# Patient Record
Sex: FEMALE | Race: WHITE | NOT HISPANIC OR LATINO | URBAN - NONMETROPOLITAN AREA
[De-identification: names, ages, dates, MRNs, and addresses within clinical notes are randomized per-mention and may not be internally consistent; named-entity substitution may affect disease eponyms.]

---

## 2017-03-27 NOTE — PATIENT DISCUSSION
Continue: Refresh Liquigel (carboxymethylcellulose sodium): drops, liquid gel: 1% 1 drop at bedtime into both eyes

## 2017-03-27 NOTE — PATIENT DISCUSSION
Blepharitis/MGD Counseling:   Blepharitis/MGD is inflammation of the eyelids and/or blockage of the oil glands. I have explained to the patient that successful management is dependent on patient compliance with the treatment regimen.

## 2017-03-27 NOTE — PATIENT DISCUSSION
BLURRED VISION, OU- MOST LIKELY DUE TO DRYNESS/MGD. TREAT KIESHA/MGD AND REEVALUATE IF SYMPTOMS PERSIST.

## 2017-03-27 NOTE — PATIENT DISCUSSION
Continue: Refresh Plus (carboxymethylcellulose sodium): dropperette: 0.5% 1 drop five times a day into left eye

## 2017-03-27 NOTE — PATIENT DISCUSSION
BLEPHARITIS/MGD, OU- PRESCRIBE WARM COMPRESSES AND EYELID SCRUBS BID, ARTIFICIAL TEARS BID-QID, AND THE DAILY INTAKE OF OMEGA-3 FATTY ACIDS.

## 2017-04-27 NOTE — PATIENT DISCUSSION
Continue: Refresh Plus (carboxymethylcellulose sodium): dropperette: 0.5% 1 drop five times a day into both eyes

## 2017-04-27 NOTE — PATIENT DISCUSSION
CONTINUE OMEGA 3 MAKE SURE IT IS A TRIGLYCDERIDE AND WARM COPRESSES BID FOLLOWED BY DIGITAL MASSAGE.

## 2018-08-03 NOTE — PATIENT DISCUSSION
Epithelial Membrane Dystrophy Counseling: The nature of the dystrophy was explained to the patient including the fact that often times the dystrophy is asymptomatic. When symptoms such as fluctuating vision, foreign body sensation, or decreased vision present, the treatment can include Martin ointment or drops, artificial tears, punctal plugs, epithelial debridement /scraping and possible polishing of underlying cornea.   If the dystrophy interferes with cataract testing, treatment may need to be initiated first.

## 2018-08-03 NOTE — PATIENT DISCUSSION
PATIENT ALSO MENTIONED USE OF ARTIFICIAL TEARS. RECOMMENDED HE USES CELLUVISC. PATIENT CURRENTLY USES REFRESH LIQUIGEL.

## 2018-08-03 NOTE — PATIENT DISCUSSION
PICTURE OF IDANIA OPTIONS GIVEN TO PATIENT. RECOMMENDED OINTMENT NOT DROPS. LEFT EYE IS WORSE. TOLD PT IF HE WANTS TO JUST TRY IT IN THE LEFT EYE ONLY FOR NOW HE MAY TO SEE IF IT IMPROVES. DO FOR 6 WEEKS AND SEE IF IMPROVEMENT HAPPENS.

## 2019-04-30 ENCOUNTER — IMPORTED ENCOUNTER (OUTPATIENT)
Dept: URBAN - NONMETROPOLITAN AREA CLINIC 1 | Facility: CLINIC | Age: 74
End: 2019-04-30

## 2019-04-30 PROBLEM — E11.9: Noted: 2018-04-25

## 2019-04-30 PROBLEM — H25.813: Noted: 2019-04-30

## 2019-04-30 PROCEDURE — 99214 OFFICE O/P EST MOD 30 MIN: CPT

## 2019-04-30 PROCEDURE — 92250 FUNDUS PHOTOGRAPHY W/I&R: CPT

## 2019-06-07 ENCOUNTER — IMPORTED ENCOUNTER (OUTPATIENT)
Dept: URBAN - NONMETROPOLITAN AREA CLINIC 1 | Facility: CLINIC | Age: 74
End: 2019-06-07

## 2019-06-07 PROBLEM — Z01.818: Noted: 2019-06-11

## 2019-06-07 PROBLEM — E11.9: Noted: 2019-06-07

## 2019-06-07 PROBLEM — Z98.42: Noted: 2019-06-21

## 2019-06-07 PROBLEM — I10: Noted: 2019-06-11

## 2019-06-07 PROBLEM — H25.813: Noted: 2019-06-07

## 2019-06-07 PROCEDURE — 92014 COMPRE OPH EXAM EST PT 1/>: CPT

## 2019-06-07 NOTE — PATIENT DISCUSSION
Cataract OU-Visually significant cataract OU.-Cataract(s) causing symptomatic impairment of visual function not correctable with a tolerable change in glasses or contact lenses lighting or non-operative means resulting in specific activity limitations and/or participation restrictions including but not limited to reading viewing television driving or meeting vocational or recreational needs. -Expectation is clearer vision and functional improvement in symptoms as well as reduced glare disability after cataract removal.-Order IOLMaster and OPD today. -Recommend Standard/Traditional OU based on today's OPD testing and lifestyle questionnaire.-All questions were answered regarding surgery including pre and post-op medications appointments activity restrictions and anesthetic usage.-The risks benefits and alternatives and special risk factors for the patient were discussed in detail including but not limited to: bleeding infection retinal detachment vitreous loss problems with the implant and possible need for additional surgery.-Although rare the possibility of complete vision loss was discussed.-The possible need for glasses post-operatively was discussed.-Order medical clearance exam based on history of hypertension and diabetes. -Patient elects to proceed with cataract surgery OS. Will schedule at patient's convenience and re-evaluate OD in the future. ***Patient elects Standard/Traditional OU starting with THEO LARSEN-Stressed the importance of keeping blood sugars under control blood pressure under control and weight normalization and regular visits with PCP. -Explained the possible effects of poorly controlled diabetes and the damage that diabetes can cause to ocular health. -Patient to check HgbA1C.-Pt instructed to contact our office with any vision changes.

## 2019-06-10 ENCOUNTER — IMPORTED ENCOUNTER (OUTPATIENT)
Dept: URBAN - NONMETROPOLITAN AREA CLINIC 1 | Facility: CLINIC | Age: 74
End: 2019-06-10

## 2019-06-10 PROBLEM — E11.9: Noted: 2019-06-10

## 2019-06-10 PROBLEM — H25.813: Noted: 2019-06-10

## 2019-06-11 ENCOUNTER — IMPORTED ENCOUNTER (OUTPATIENT)
Dept: URBAN - NONMETROPOLITAN AREA CLINIC 1 | Facility: CLINIC | Age: 74
End: 2019-06-11

## 2019-06-21 ENCOUNTER — IMPORTED ENCOUNTER (OUTPATIENT)
Dept: URBAN - NONMETROPOLITAN AREA CLINIC 1 | Facility: CLINIC | Age: 74
End: 2019-06-21

## 2019-06-21 PROCEDURE — 92136 OPHTHALMIC BIOMETRY: CPT

## 2019-06-21 PROCEDURE — 99024 POSTOP FOLLOW-UP VISIT: CPT

## 2019-06-21 PROCEDURE — 66984 XCAPSL CTRC RMVL W/O ECP: CPT

## 2019-06-21 NOTE — PATIENT DISCUSSION
Same day s/p PCIOL OS-Pt doing well s/p PCIOL. -Continue post-op gtts according to instruction sheet and sleep with eye shield over eye for 7 nights.-Avoid bending at the waist lifting anything over 5lbs and dirty or talya environments. -RTC .

## 2019-06-27 ENCOUNTER — IMPORTED ENCOUNTER (OUTPATIENT)
Dept: URBAN - NONMETROPOLITAN AREA CLINIC 1 | Facility: CLINIC | Age: 74
End: 2019-06-27

## 2019-06-27 PROBLEM — Z98.42: Noted: 2019-06-21

## 2019-06-27 PROBLEM — H25.811: Noted: 2019-06-27

## 2019-06-27 PROBLEM — E11.9: Noted: 2019-06-27

## 2019-06-27 PROCEDURE — 99024 POSTOP FOLLOW-UP VISIT: CPT

## 2019-07-03 ENCOUNTER — IMPORTED ENCOUNTER (OUTPATIENT)
Dept: URBAN - NONMETROPOLITAN AREA CLINIC 1 | Facility: CLINIC | Age: 74
End: 2019-07-03

## 2019-07-03 PROBLEM — Z98.42: Noted: 2019-07-03

## 2019-07-03 PROBLEM — E11.9: Noted: 2019-06-27

## 2019-07-03 PROBLEM — I10: Noted: 2019-07-03

## 2019-07-03 PROBLEM — E11.9: Noted: 2019-07-03

## 2019-07-03 PROBLEM — Z01.818: Noted: 2019-07-03

## 2019-07-03 PROBLEM — H25.811: Noted: 2019-07-03

## 2019-07-03 PROBLEM — Z98.41: Noted: 2019-07-25

## 2019-07-03 PROCEDURE — 92012 INTRM OPH EXAM EST PATIENT: CPT

## 2019-07-03 PROCEDURE — 99024 POSTOP FOLLOW-UP VISIT: CPT

## 2019-07-23 ENCOUNTER — IMPORTED ENCOUNTER (OUTPATIENT)
Dept: URBAN - NONMETROPOLITAN AREA CLINIC 1 | Facility: CLINIC | Age: 74
End: 2019-07-23

## 2019-07-23 PROCEDURE — 92136 OPHTHALMIC BIOMETRY: CPT

## 2019-07-23 PROCEDURE — 99024 POSTOP FOLLOW-UP VISIT: CPT

## 2019-07-23 PROCEDURE — 66984 XCAPSL CTRC RMVL W/O ECP: CPT

## 2019-07-23 NOTE — PATIENT DISCUSSION
same day s/p PCIOL OD-Pt doing well s/p PCIOL. -Continue post-op gtts according to instruction sheet and sleep with eye shield over eye for 7 nights.-Avoid bending at the waist lifting anything over 5lbs and dirty or talya environments.

## 2019-07-30 ENCOUNTER — IMPORTED ENCOUNTER (OUTPATIENT)
Dept: URBAN - NONMETROPOLITAN AREA CLINIC 1 | Facility: CLINIC | Age: 74
End: 2019-07-30

## 2019-07-30 PROBLEM — Z98.42: Noted: 2019-07-30

## 2019-07-30 PROBLEM — Z98.41: Noted: 2019-07-25

## 2019-07-30 PROBLEM — E11.9: Noted: 2019-07-30

## 2019-07-30 PROCEDURE — 99024 POSTOP FOLLOW-UP VISIT: CPT

## 2019-07-30 NOTE — PATIENT DISCUSSION
1 week s/p PCIOL OD-Pt doing well at 1 week s/p PCIOL. -Continue post-op gtts according to instruction sheet.-Okay to resume usual activites and d/c eye shield. 5 week s/p PCIOL OS-Pt doing well at 5 week s/p PCIOL. -Continue post-op gtts according to instruction sheet.-Okay to resume usual activites and d/c eye shield.

## 2019-09-03 ENCOUNTER — IMPORTED ENCOUNTER (OUTPATIENT)
Dept: URBAN - NONMETROPOLITAN AREA CLINIC 1 | Facility: CLINIC | Age: 74
End: 2019-09-03

## 2019-09-03 PROBLEM — H52.223: Noted: 2019-09-03

## 2019-09-03 PROBLEM — Z98.41: Noted: 2019-07-25

## 2019-09-03 PROBLEM — E11.9: Noted: 2019-07-30

## 2019-09-03 PROBLEM — Z98.42: Noted: 2019-07-30

## 2019-09-03 PROCEDURE — 92015 DETERMINE REFRACTIVE STATE: CPT

## 2019-09-03 PROCEDURE — 99024 POSTOP FOLLOW-UP VISIT: CPT

## 2020-08-06 ENCOUNTER — IMPORTED ENCOUNTER (OUTPATIENT)
Dept: URBAN - NONMETROPOLITAN AREA CLINIC 1 | Facility: CLINIC | Age: 75
End: 2020-08-06

## 2020-08-06 PROBLEM — H52.4: Noted: 2021-08-10

## 2020-08-06 PROBLEM — Z96.1: Noted: 2020-08-06

## 2020-08-06 PROBLEM — E11.9: Noted: 2020-08-06

## 2020-08-06 PROBLEM — H52.4: Noted: 2020-08-06

## 2020-08-06 PROBLEM — H52.223: Noted: 2020-08-06

## 2020-08-06 PROCEDURE — 92014 COMPRE OPH EXAM EST PT 1/>: CPT

## 2020-08-06 PROCEDURE — 92250 FUNDUS PHOTOGRAPHY W/I&R: CPT

## 2020-08-06 NOTE — PATIENT DISCUSSION
NIDDM s  OU-Stressed the importance of keeping blood sugars under control blood pressure under control and weight normalization and regular visits with PCP. -Explained the possible effects of poorly controlled diabetes and the damage that diabetes can cause to ocular health. -Patient to check HgbA1C.-Pt instructed to contact our office with any vision changes. - Recommend follow-up in 8 months for yearly diabetic check OU with photos. AstigmatismOU/Presbyopia OU-Discussed diagnosis with patient.-Continue to monitor Pseudophakia OU w.  PCO OD -Discussed lens options in detail with patient -Trace PCO Noted OD but no treatment neededd. -Continue to monitor; 's Notes: DFE 8/6/20Photos 8/6/20

## 2020-12-16 NOTE — PATIENT DISCUSSION
Continue Celluvisc or Repair. Can start Optase TTO Wipes. May start Retaine MGD if not happy with Celluvisc.

## 2021-05-04 NOTE — PATIENT DISCUSSION
Recommend no further eye surgery in OD (no lasik) General Sunscreen Counseling: I recommended a broad spectrum sunscreen with a SPF of 30 or higher.  I explained that SPF 30 sunscreens block approximately 97 percent of the sun's harmful rays.  Sunscreens should be applied at least 15 minutes prior to expected sun exposure and then every 2 hours after that as long as sun exposure continues. If swimming or exercising sunscreen should be reapplied every 45 minutes to an hour after getting wet or sweating.  One ounce, or the equivalent of a shot glass full of sunscreen, is adequate to protect the skin not covered by a bathing suit. I also recommended a lip balm with a sunscreen as well. Sun protective clothing can be used in lieu of sunscreen but must be worn the entire time you are exposed to the sun's rays. Detail Level: Detailed

## 2021-08-10 ENCOUNTER — IMPORTED ENCOUNTER (OUTPATIENT)
Dept: URBAN - NONMETROPOLITAN AREA CLINIC 1 | Facility: CLINIC | Age: 76
End: 2021-08-10

## 2021-08-10 PROBLEM — Z96.1: Noted: 2020-08-06

## 2021-08-10 PROBLEM — H52.4: Noted: 2021-08-10

## 2021-08-10 PROBLEM — E11.9: Noted: 2020-08-06

## 2021-08-10 PROCEDURE — 99214 OFFICE O/P EST MOD 30 MIN: CPT

## 2021-08-10 PROCEDURE — 92015 DETERMINE REFRACTIVE STATE: CPT

## 2021-08-10 PROCEDURE — 92250 FUNDUS PHOTOGRAPHY W/I&R: CPT

## 2021-08-10 NOTE — PATIENT DISCUSSION
NIDDM sans Retinopathy Discussed diagnosis with patient. Discussed the risk of diabetic damage of the retina with potential vision loss and the importance of routine follow-up. Emphasized strict blood sugar control. Photos done today; no BDR OU. Continue to monitor. RTC in 1 year with photosP/C IOL OUDiscussed diagnosis in detail with patient. Both intraocular implants in place and stable. Continue to monitor. Presbyopia OUDiscussed refractive status in detail with patient. New glasses Rx given today. Continue to monitor.; 's Notes: MR  8/10/21DFE  8/10/21PHOTOS  8/10/21

## 2022-04-10 ASSESSMENT — TONOMETRY
OS_IOP_MMHG: 17
OD_IOP_MMHG: 16
OS_IOP_MMHG: 15
OS_IOP_MMHG: 18
OD_IOP_MMHG: 14
OS_IOP_MMHG: 16
OS_IOP_MMHG: 14
OD_IOP_MMHG: 25
OS_IOP_MMHG: 17
OS_IOP_MMHG: 18
OD_IOP_MMHG: 15
OS_IOP_MMHG: 16
OD_IOP_MMHG: 19
OS_IOP_MMHG: 16
OS_IOP_MMHG: 14
OD_IOP_MMHG: 14
OD_IOP_MMHG: 14
OD_IOP_MMHG: 16
OD_IOP_MMHG: 17
OD_IOP_MMHG: 14

## 2022-04-10 ASSESSMENT — VISUAL ACUITY
OD_PH: 20/40
OD_PAM: 20/25
OD_CC: 20/70
OD_CC: 20/50-
OS_PH: 20/40
OS_GLARE: 20/200
OS_GLARE: 20/100
OS_SC: 20/60
OD_GLARE: 20/200
OD_GLARE: 20/200
OD_PH: 20/30
OD_PH: 20/40
OD_SC: 20/40-
OD_PH: 20/40
OD_PH: 20/40-
OS_GLARE: 20/200
OD_PH: 20/30
OS_CC: 20/25-1
OD_PH: 20/40
OS_CC: J2
OS_AM: 20/25
OD_PAM: 20/25
OS_SC: 20/40-1
OS_SC: 20/70+
OD_GLARE: 20/200
OD_SC: 20/60-1
OS_PH: 20/25
OD_SC: 20/70+
OD_CC: 20/70
OD_CC: 20/70+
OD_GLARE: 20/200
OD_CC: 20/70+
OD_PAM: 20/25
OD_CC: 20/100
OS_SC: 20/30
OS_AM: 20/25
OS_CC: J1
OS_CC: 20/30
OS_CC: 20/70
OS_CC: 20/20-2
OD_CC: J3
OS_PH: 20/30
OS_SC: 20/60
OS_SC: 20/40
OS_CC: 20/30-
OD_SC: 20/50-2
OD_CC: 20/40-2
OD_CC: J1
OS_CC: 20/80
OS_CC: 20/40
OD_PH: 20/70
OS_SC: 20/70+

## 2022-05-02 NOTE — PATIENT DISCUSSION
Early posterior capsular opacification was noted. It was not causing any decreased vision or glare at this time. No treatment was recommended at this time. Visual blur likely due to dry eye. Patient has future travel plan will follow up in 3-6 months for re-evaluation. Patient to continue current regimen.

## 2022-08-16 ENCOUNTER — COMPREHENSIVE EXAM (OUTPATIENT)
Dept: RURAL CLINIC 3 | Facility: CLINIC | Age: 77
End: 2022-08-16

## 2022-08-16 DIAGNOSIS — E11.9: ICD-10-CM

## 2022-08-16 DIAGNOSIS — H52.4: ICD-10-CM

## 2022-08-16 PROCEDURE — 99214 OFFICE O/P EST MOD 30 MIN: CPT

## 2022-08-16 PROCEDURE — 92250 FUNDUS PHOTOGRAPHY W/I&R: CPT

## 2022-08-16 PROCEDURE — 92015 DETERMINE REFRACTIVE STATE: CPT

## 2022-08-16 ASSESSMENT — TONOMETRY
OD_IOP_MMHG: 15
OS_IOP_MMHG: 15

## 2022-08-16 ASSESSMENT — VISUAL ACUITY
OD_PH: 20/40
OS_SC: 20/40
OD_SC: 20/60
OS_PH: 20/30

## 2023-08-30 ENCOUNTER — FOLLOW UP (OUTPATIENT)
Dept: RURAL CLINIC 3 | Facility: CLINIC | Age: 78
End: 2023-08-30

## 2023-08-30 DIAGNOSIS — H26.493: ICD-10-CM

## 2023-08-30 DIAGNOSIS — H52.4: ICD-10-CM

## 2023-08-30 DIAGNOSIS — E11.9: ICD-10-CM

## 2023-08-30 PROCEDURE — 92015 DETERMINE REFRACTIVE STATE: CPT

## 2023-08-30 PROCEDURE — 99214 OFFICE O/P EST MOD 30 MIN: CPT

## 2023-08-30 PROCEDURE — 92250 FUNDUS PHOTOGRAPHY W/I&R: CPT

## 2023-08-30 ASSESSMENT — VISUAL ACUITY
OS_CC: 20/30
OD_PH: 20/30-2
OD_CC: 20/50

## 2023-08-30 ASSESSMENT — TONOMETRY
OD_IOP_MMHG: 12
OS_IOP_MMHG: 12

## 2024-09-03 ENCOUNTER — COMPREHENSIVE EXAM (OUTPATIENT)
Dept: RURAL CLINIC 3 | Facility: CLINIC | Age: 79
End: 2024-09-03

## 2024-09-03 DIAGNOSIS — H52.4: ICD-10-CM

## 2024-09-03 DIAGNOSIS — Z96.1: ICD-10-CM

## 2024-09-03 DIAGNOSIS — H26.493: ICD-10-CM

## 2024-09-03 DIAGNOSIS — E11.9: ICD-10-CM

## 2024-09-03 PROCEDURE — 92015 DETERMINE REFRACTIVE STATE: CPT

## 2024-09-03 PROCEDURE — 92250 FUNDUS PHOTOGRAPHY W/I&R: CPT

## 2024-09-03 PROCEDURE — 92014 COMPRE OPH EXAM EST PT 1/>: CPT
